# Patient Record
Sex: FEMALE | Race: WHITE | NOT HISPANIC OR LATINO | URBAN - METROPOLITAN AREA
[De-identification: names, ages, dates, MRNs, and addresses within clinical notes are randomized per-mention and may not be internally consistent; named-entity substitution may affect disease eponyms.]

---

## 2024-02-17 PROCEDURE — 89190 NASAL SMEAR FOR EOSINOPHILS: CPT

## 2024-02-19 ENCOUNTER — LAB REQUISITION (OUTPATIENT)
Dept: LAB | Facility: HOSPITAL | Age: 69
End: 2024-02-19
Payer: MEDICARE

## 2024-02-19 LAB — EOSINOPHIL SMEAR: ABNORMAL

## 2024-03-04 ENCOUNTER — LAB REQUISITION (OUTPATIENT)
Dept: LAB | Facility: HOSPITAL | Age: 69
End: 2024-03-04
Payer: MEDICARE

## 2024-03-05 LAB
LABORATORY COMMENT REPORT: NORMAL
PATH REPORT.GROSS SPEC: NORMAL
PATH REPORT.TOTAL CANCER: NORMAL

## 2024-03-07 ENCOUNTER — LAB REQUISITION (OUTPATIENT)
Dept: LAB | Facility: HOSPITAL | Age: 69
End: 2024-03-07
Payer: MEDICARE

## 2024-03-07 DIAGNOSIS — N04.9 NEPHROTIC SYNDROME WITH UNSPECIFIED MORPHOLOGIC CHANGES: ICD-10-CM

## 2024-03-07 PROCEDURE — 88350 IMFLUOR EA ADDL 1ANTB STN PX: CPT | Performed by: PATHOLOGY

## 2024-03-07 PROCEDURE — 88305 TISSUE EXAM BY PATHOLOGIST: CPT | Performed by: PATHOLOGY

## 2024-03-07 PROCEDURE — 88346 IMFLUOR 1ST 1ANTB STAIN PX: CPT

## 2024-03-07 PROCEDURE — 88313 SPECIAL STAINS GROUP 2: CPT

## 2024-03-07 PROCEDURE — 88348 ELECTRON MICROSCOPY DX: CPT

## 2024-03-07 PROCEDURE — 88348 ELECTRON MICROSCOPY DX: CPT | Performed by: PATHOLOGY

## 2024-03-07 PROCEDURE — 88346 IMFLUOR 1ST 1ANTB STAIN PX: CPT | Performed by: PATHOLOGY

## 2024-03-07 PROCEDURE — 88305 TISSUE EXAM BY PATHOLOGIST: CPT

## 2024-03-07 PROCEDURE — 88350 IMFLUOR EA ADDL 1ANTB STN PX: CPT

## 2024-03-07 PROCEDURE — 88313 SPECIAL STAINS GROUP 2: CPT | Performed by: PATHOLOGY

## 2024-03-19 LAB
LAB AP ASR DISCLAIMER: NORMAL
LABORATORY COMMENT REPORT: NORMAL
PATH REPORT.ADDENDUM SPEC: NORMAL
PATH REPORT.COMMENTS IMP SPEC: NORMAL
PATH REPORT.FINAL DX SPEC: NORMAL
PATH REPORT.GROSS SPEC: NORMAL
PATH REPORT.MICROSCOPIC SPEC OTHER STN: NORMAL
PATH REPORT.RELEVANT HX SPEC: NORMAL
PATH REPORT.TOTAL CANCER: NORMAL

## 2024-04-29 ENCOUNTER — LAB REQUISITION (OUTPATIENT)
Dept: LAB | Facility: HOSPITAL | Age: 69
End: 2024-04-29
Payer: MEDICARE

## 2024-04-29 ENCOUNTER — HOSPITAL ENCOUNTER (EMERGENCY)
Facility: HOSPITAL | Age: 69
Discharge: HOME | End: 2024-04-30
Attending: EMERGENCY MEDICINE
Payer: MEDICARE

## 2024-04-29 DIAGNOSIS — I48.19 OTHER PERSISTENT ATRIAL FIBRILLATION (MULTI): ICD-10-CM

## 2024-04-29 DIAGNOSIS — K59.00 CONSTIPATION, UNSPECIFIED CONSTIPATION TYPE: ICD-10-CM

## 2024-04-29 DIAGNOSIS — R07.9 CHEST PAIN, UNSPECIFIED TYPE: Primary | ICD-10-CM

## 2024-04-29 DIAGNOSIS — R51.9 ACUTE NONINTRACTABLE HEADACHE, UNSPECIFIED HEADACHE TYPE: ICD-10-CM

## 2024-04-29 LAB
ALBUMIN SERPL BCP-MCNC: 3.2 G/DL (ref 3.4–5)
ALP SERPL-CCNC: 45 U/L (ref 33–136)
ALT SERPL W P-5'-P-CCNC: 16 U/L (ref 7–45)
ANION GAP SERPL CALC-SCNC: 12 MMOL/L (ref 10–20)
AST SERPL W P-5'-P-CCNC: 19 U/L (ref 9–39)
BASOPHILS # BLD AUTO: 0.03 X10*3/UL (ref 0–0.1)
BASOPHILS NFR BLD AUTO: 0.4 %
BILIRUB SERPL-MCNC: 0.4 MG/DL (ref 0–1.2)
BUN SERPL-MCNC: 20 MG/DL (ref 6–23)
CALCIUM SERPL-MCNC: 9.9 MG/DL (ref 8.6–10.3)
CHLORIDE SERPL-SCNC: 105 MMOL/L (ref 98–107)
CO2 SERPL-SCNC: 25 MMOL/L (ref 21–32)
CREAT SERPL-MCNC: 1.12 MG/DL (ref 0.5–1.05)
EGFRCR SERPLBLD CKD-EPI 2021: 54 ML/MIN/1.73M*2
EOSINOPHIL # BLD AUTO: 0.28 X10*3/UL (ref 0–0.7)
EOSINOPHIL NFR BLD AUTO: 4.1 %
ERYTHROCYTE [DISTWIDTH] IN BLOOD BY AUTOMATED COUNT: 13.5 % (ref 11.5–14.5)
GLUCOSE SERPL-MCNC: 87 MG/DL (ref 74–99)
HCT VFR BLD AUTO: 34.3 % (ref 36–46)
HGB BLD-MCNC: 11.2 G/DL (ref 12–16)
IMM GRANULOCYTES # BLD AUTO: 0.01 X10*3/UL (ref 0–0.7)
IMM GRANULOCYTES NFR BLD AUTO: 0.1 % (ref 0–0.9)
LYMPHOCYTES # BLD AUTO: 1.88 X10*3/UL (ref 1.2–4.8)
LYMPHOCYTES NFR BLD AUTO: 27.4 %
MCH RBC QN AUTO: 29.6 PG (ref 26–34)
MCHC RBC AUTO-ENTMCNC: 32.7 G/DL (ref 32–36)
MCV RBC AUTO: 91 FL (ref 80–100)
MONOCYTES # BLD AUTO: 0.49 X10*3/UL (ref 0.1–1)
MONOCYTES NFR BLD AUTO: 7.1 %
NEUTROPHILS # BLD AUTO: 4.17 X10*3/UL (ref 1.2–7.7)
NEUTROPHILS NFR BLD AUTO: 60.9 %
NRBC BLD-RTO: 0 /100 WBCS (ref 0–0)
PLATELET # BLD AUTO: 221 X10*3/UL (ref 150–450)
POTASSIUM SERPL-SCNC: 3.7 MMOL/L (ref 3.5–5.3)
PROT SERPL-MCNC: 5.4 G/DL (ref 6.4–8.2)
RBC # BLD AUTO: 3.79 X10*6/UL (ref 4–5.2)
SODIUM SERPL-SCNC: 138 MMOL/L (ref 136–145)
WBC # BLD AUTO: 6.9 X10*3/UL (ref 4.4–11.3)

## 2024-04-29 PROCEDURE — 85025 COMPLETE CBC W/AUTO DIFF WBC: CPT

## 2024-04-29 PROCEDURE — 93005 ELECTROCARDIOGRAM TRACING: CPT

## 2024-04-29 PROCEDURE — 80053 COMPREHEN METABOLIC PANEL: CPT

## 2024-04-29 PROCEDURE — 99285 EMERGENCY DEPT VISIT HI MDM: CPT | Mod: 25

## 2024-04-29 ASSESSMENT — COLUMBIA-SUICIDE SEVERITY RATING SCALE - C-SSRS
1. IN THE PAST MONTH, HAVE YOU WISHED YOU WERE DEAD OR WISHED YOU COULD GO TO SLEEP AND NOT WAKE UP?: NO
6. HAVE YOU EVER DONE ANYTHING, STARTED TO DO ANYTHING, OR PREPARED TO DO ANYTHING TO END YOUR LIFE?: NO
2. HAVE YOU ACTUALLY HAD ANY THOUGHTS OF KILLING YOURSELF?: NO

## 2024-04-29 ASSESSMENT — PAIN SCALES - GENERAL
PAINLEVEL_OUTOF10: 9
PAINLEVEL_OUTOF10: 9

## 2024-04-29 ASSESSMENT — LIFESTYLE VARIABLES
EVER HAD A DRINK FIRST THING IN THE MORNING TO STEADY YOUR NERVES TO GET RID OF A HANGOVER: NO
HAVE PEOPLE ANNOYED YOU BY CRITICIZING YOUR DRINKING: NO
EVER FELT BAD OR GUILTY ABOUT YOUR DRINKING: NO
HAVE YOU EVER FELT YOU SHOULD CUT DOWN ON YOUR DRINKING: NO
TOTAL SCORE: 0

## 2024-04-29 ASSESSMENT — PAIN DESCRIPTION - PAIN TYPE: TYPE: ACUTE PAIN

## 2024-04-29 ASSESSMENT — PAIN - FUNCTIONAL ASSESSMENT: PAIN_FUNCTIONAL_ASSESSMENT: 0-10

## 2024-04-29 ASSESSMENT — PAIN DESCRIPTION - LOCATION
LOCATION_2: CHEST
LOCATION: HEAD

## 2024-04-29 ASSESSMENT — PAIN DESCRIPTION - DESCRIPTORS: DESCRIPTORS_2: HEAVINESS;ACHING

## 2024-04-30 ENCOUNTER — APPOINTMENT (OUTPATIENT)
Dept: CARDIOLOGY | Facility: HOSPITAL | Age: 69
End: 2024-04-30
Payer: MEDICARE

## 2024-04-30 ENCOUNTER — APPOINTMENT (OUTPATIENT)
Dept: RADIOLOGY | Facility: HOSPITAL | Age: 69
End: 2024-04-30
Payer: MEDICARE

## 2024-04-30 VITALS
WEIGHT: 212 LBS | HEIGHT: 63 IN | SYSTOLIC BLOOD PRESSURE: 149 MMHG | RESPIRATION RATE: 16 BRPM | BODY MASS INDEX: 37.56 KG/M2 | HEART RATE: 72 BPM | OXYGEN SATURATION: 97 % | TEMPERATURE: 96.8 F | DIASTOLIC BLOOD PRESSURE: 64 MMHG

## 2024-04-30 PROBLEM — I67.1 INTRACRANIAL ANEURYSM (HHS-HCC): Status: ACTIVE | Noted: 2024-02-19

## 2024-04-30 PROBLEM — E83.42 HYPOMAGNESEMIA: Status: ACTIVE | Noted: 2024-02-15

## 2024-04-30 PROBLEM — G89.29 CHRONIC PAIN OF RIGHT KNEE: Status: ACTIVE | Noted: 2021-05-04

## 2024-04-30 PROBLEM — M25.561 CHRONIC PAIN OF RIGHT KNEE: Status: ACTIVE | Noted: 2021-05-04

## 2024-04-30 PROBLEM — K59.03 DRUG-INDUCED CONSTIPATION: Status: ACTIVE | Noted: 2024-04-02

## 2024-04-30 PROBLEM — G47.00 INSOMNIA: Status: ACTIVE | Noted: 2024-03-08

## 2024-04-30 PROBLEM — I25.10 CORONARY ARTERY DISEASE INVOLVING NATIVE CORONARY ARTERY OF NATIVE HEART WITHOUT ANGINA PECTORIS: Status: ACTIVE | Noted: 2024-03-12

## 2024-04-30 PROBLEM — K57.30 DIVERTICULOSIS OF COLON WITHOUT DIVERTICULITIS: Status: ACTIVE | Noted: 2024-03-02

## 2024-04-30 PROBLEM — R93.89 ABNORMAL MRI: Status: ACTIVE | Noted: 2024-04-17

## 2024-04-30 PROBLEM — N17.9 AKI (ACUTE KIDNEY INJURY) (CMS-HCC): Status: ACTIVE | Noted: 2024-02-19

## 2024-04-30 PROBLEM — R73.9 HYPERGLYCEMIA: Status: ACTIVE | Noted: 2024-04-16

## 2024-04-30 PROBLEM — E04.2 NONTOXIC MULTINODULAR GOITER: Status: ACTIVE | Noted: 2021-06-16

## 2024-04-30 PROBLEM — R53.1 ASTHENIA: Status: ACTIVE | Noted: 2020-03-04

## 2024-04-30 PROBLEM — M94.0 TIETZE'S DISEASE: Status: ACTIVE | Noted: 2024-04-30

## 2024-04-30 PROBLEM — C7A.026: Status: ACTIVE | Noted: 2021-06-16

## 2024-04-30 PROBLEM — E11.10 DIABETIC KETOACIDOSIS WITHOUT COMA ASSOCIATED WITH TYPE 2 DIABETES MELLITUS (MULTI): Status: ACTIVE | Noted: 2019-06-13

## 2024-04-30 PROBLEM — I63.9 CEREBRAL INFARCTION (MULTI): Status: ACTIVE | Noted: 2022-05-25

## 2024-04-30 PROBLEM — K31.84 GASTROPARESIS: Status: ACTIVE | Noted: 2024-04-16

## 2024-04-30 PROBLEM — I21.4 NSTEMI (NON-ST ELEVATED MYOCARDIAL INFARCTION) (MULTI): Status: ACTIVE | Noted: 2024-03-11

## 2024-04-30 PROBLEM — K29.70 GASTRITIS WITHOUT BLEEDING: Status: ACTIVE | Noted: 2024-02-22

## 2024-04-30 PROBLEM — E10.9 TYPE 1 DIABETES MELLITUS (MULTI): Status: ACTIVE | Noted: 2020-03-16

## 2024-04-30 PROBLEM — E44.1 MALNUTRITION OF MILD DEGREE (MULTI): Status: ACTIVE | Noted: 2024-04-09

## 2024-04-30 LAB
ALBUMIN SERPL BCP-MCNC: 3.3 G/DL (ref 3.4–5)
ALP SERPL-CCNC: 41 U/L (ref 33–136)
ALT SERPL W P-5'-P-CCNC: 16 U/L (ref 7–45)
ANION GAP SERPL CALC-SCNC: 16 MMOL/L (ref 10–20)
AST SERPL W P-5'-P-CCNC: 21 U/L (ref 9–39)
BASOPHILS # BLD AUTO: 0.05 X10*3/UL (ref 0–0.1)
BASOPHILS NFR BLD AUTO: 0.7 %
BILIRUB SERPL-MCNC: 0.5 MG/DL (ref 0–1.2)
BUN SERPL-MCNC: 21 MG/DL (ref 6–23)
CALCIUM SERPL-MCNC: 9.8 MG/DL (ref 8.6–10.3)
CARDIAC TROPONIN I PNL SERPL HS: 30 NG/L (ref 0–13)
CARDIAC TROPONIN I PNL SERPL HS: 30 NG/L (ref 0–13)
CHLORIDE SERPL-SCNC: 105 MMOL/L (ref 98–107)
CO2 SERPL-SCNC: 21 MMOL/L (ref 21–32)
CREAT SERPL-MCNC: 1.26 MG/DL (ref 0.5–1.05)
EGFRCR SERPLBLD CKD-EPI 2021: 47 ML/MIN/1.73M*2
EOSINOPHIL # BLD AUTO: 0.31 X10*3/UL (ref 0–0.7)
EOSINOPHIL NFR BLD AUTO: 4.1 %
ERYTHROCYTE [DISTWIDTH] IN BLOOD BY AUTOMATED COUNT: 13.4 % (ref 11.5–14.5)
GLUCOSE SERPL-MCNC: 189 MG/DL (ref 74–99)
HCT VFR BLD AUTO: 35 % (ref 36–46)
HGB BLD-MCNC: 11.7 G/DL (ref 12–16)
IMM GRANULOCYTES # BLD AUTO: 0.02 X10*3/UL (ref 0–0.7)
IMM GRANULOCYTES NFR BLD AUTO: 0.3 % (ref 0–0.9)
LYMPHOCYTES # BLD AUTO: 1.95 X10*3/UL (ref 1.2–4.8)
LYMPHOCYTES NFR BLD AUTO: 25.6 %
MAGNESIUM SERPL-MCNC: 1.99 MG/DL (ref 1.6–2.4)
MCH RBC QN AUTO: 30.2 PG (ref 26–34)
MCHC RBC AUTO-ENTMCNC: 33.4 G/DL (ref 32–36)
MCV RBC AUTO: 90 FL (ref 80–100)
MONOCYTES # BLD AUTO: 0.49 X10*3/UL (ref 0.1–1)
MONOCYTES NFR BLD AUTO: 6.4 %
NEUTROPHILS # BLD AUTO: 4.79 X10*3/UL (ref 1.2–7.7)
NEUTROPHILS NFR BLD AUTO: 62.9 %
NRBC BLD-RTO: 0 /100 WBCS (ref 0–0)
PLATELET # BLD AUTO: 208 X10*3/UL (ref 150–450)
POTASSIUM SERPL-SCNC: 4.2 MMOL/L (ref 3.5–5.3)
PROT SERPL-MCNC: 5.5 G/DL (ref 6.4–8.2)
RBC # BLD AUTO: 3.87 X10*6/UL (ref 4–5.2)
SODIUM SERPL-SCNC: 138 MMOL/L (ref 136–145)
WBC # BLD AUTO: 7.6 X10*3/UL (ref 4.4–11.3)

## 2024-04-30 PROCEDURE — 71046 X-RAY EXAM CHEST 2 VIEWS: CPT | Performed by: RADIOLOGY

## 2024-04-30 PROCEDURE — 71046 X-RAY EXAM CHEST 2 VIEWS: CPT

## 2024-04-30 PROCEDURE — 36415 COLL VENOUS BLD VENIPUNCTURE: CPT | Performed by: NURSE PRACTITIONER

## 2024-04-30 PROCEDURE — 70450 CT HEAD/BRAIN W/O DYE: CPT

## 2024-04-30 PROCEDURE — 70450 CT HEAD/BRAIN W/O DYE: CPT | Performed by: SURGERY

## 2024-04-30 PROCEDURE — 84484 ASSAY OF TROPONIN QUANT: CPT | Performed by: NURSE PRACTITIONER

## 2024-04-30 PROCEDURE — 83735 ASSAY OF MAGNESIUM: CPT | Performed by: NURSE PRACTITIONER

## 2024-04-30 PROCEDURE — 85025 COMPLETE CBC W/AUTO DIFF WBC: CPT | Performed by: NURSE PRACTITIONER

## 2024-04-30 PROCEDURE — 80053 COMPREHEN METABOLIC PANEL: CPT | Performed by: NURSE PRACTITIONER

## 2024-04-30 RX ORDER — POLYETHYLENE GLYCOL 3350 17 G/17G
17 POWDER, FOR SOLUTION ORAL 2 TIMES DAILY
Qty: 14 PACKET | Refills: 0 | Status: SHIPPED | OUTPATIENT
Start: 2024-04-30 | End: 2024-05-14

## 2024-04-30 ASSESSMENT — PAIN SCALES - GENERAL: PAINLEVEL_OUTOF10: 0 - NO PAIN

## 2024-04-30 NOTE — ED PROVIDER NOTES
HPI   Chief Complaint   Patient presents with    Chest Pain     Pt sts with onset chest pain and headache 1 hr PTA. Sts with intermittent dyspnea/dizziness. Also c/o abdominal pain with rectal pressure with LBM x1 week ago.       Patient is nontoxic-appearing 68-year-old female with past medical history of MARIA E, dysautonomia, cerebral infarction, NSTEMI, fibromyositis, type 1 diabetes, diverticulosis, diabetic ketoacidosis, insomnia, presents to the emergency today for complaint of chest pain, headache pain and shortness of breath.  Patient states symptoms began earlier today.  Patient states she was concerned as she had a stroke and heart attack within the past several months.  Patient states chest pain has been midsternal no alleviating or worsening factors has been fairly constant.  Patient states headache pain today feels different than previous episodes.  Patient denies any visual disturbances, numbness or tingling, injuries trauma or falls.  Patient states shortness of breath has been intermittent.  Patient complains of intermittent palpitations however denies any lightheadedness, dizziness or syncopal events.  Patient denies any abdominal pain, nausea, vomiting, diarrhea or constipation, urinary complaints, fever, shaking, or chills.                          Ina Coma Scale Score: 15         NIH Stroke Scale: 0             Patient History   Past Medical History:   Diagnosis Date    Diabetes mellitus (Multi)     Hypertension     Lupus (systemic lupus erythematosus) (Multi)     Myocardial infarction (Multi)     Renal failure     Rheumatoid arthritis (Multi)     Stroke (Multi)      Past Surgical History:   Procedure Laterality Date    CORONARY STENT PLACEMENT      THORACENTESIS       No family history on file.  Social History     Tobacco Use    Smoking status: Not on file    Smokeless tobacco: Not on file   Substance Use Topics    Alcohol use: Not on file    Drug use: Not on file       Physical Exam   ED  Triage Vitals   Temperature Heart Rate Respirations BP   04/29/24 2340 04/29/24 2340 04/29/24 2340 04/29/24 2340   35.1 °C (95.2 °F) 80 17 115/82      Pulse Ox Temp Source Heart Rate Source Patient Position   04/29/24 2340 04/29/24 2340 04/29/24 2340 04/30/24 0050   94 % Temporal Monitor Sitting      BP Location FiO2 (%)     04/30/24 0050 --     Right arm        Physical Exam  Vitals and nursing note reviewed. Exam conducted with a chaperone present.   Constitutional:       General: She is not in acute distress.     Appearance: Normal appearance. She is not ill-appearing, toxic-appearing or diaphoretic.      Interventions: She is not intubated.  HENT:      Head: Normocephalic.      Right Ear: Tympanic membrane, ear canal and external ear normal.      Left Ear: Tympanic membrane, ear canal and external ear normal.      Nose: Nose normal.      Mouth/Throat:      Mouth: Mucous membranes are moist.      Pharynx: No oropharyngeal exudate or posterior oropharyngeal erythema.   Eyes:      General:         Right eye: No discharge.         Left eye: No discharge.      Extraocular Movements: Extraocular movements intact.      Pupils: Pupils are equal, round, and reactive to light.   Neck:      Vascular: No carotid bruit.   Cardiovascular:      Rate and Rhythm: Normal rate and regular rhythm.      Pulses: Normal pulses. No decreased pulses.      Heart sounds: Normal heart sounds. Heart sounds not distant. No murmur heard.     No friction rub. No gallop.   Pulmonary:      Effort: Pulmonary effort is normal. No tachypnea, bradypnea, accessory muscle usage, prolonged expiration, respiratory distress or retractions. She is not intubated.      Breath sounds: Normal breath sounds. No stridor, decreased air movement or transmitted upper airway sounds. No decreased breath sounds, wheezing, rhonchi or rales.   Chest:      Chest wall: No tenderness.   Abdominal:      General: Abdomen is flat. Bowel sounds are normal.      Palpations:  Abdomen is soft.   Musculoskeletal:         General: Normal range of motion.      Cervical back: Normal range of motion and neck supple. No rigidity or tenderness.   Lymphadenopathy:      Cervical: No cervical adenopathy.   Skin:     General: Skin is warm and dry.      Capillary Refill: Capillary refill takes less than 2 seconds.   Neurological:      General: No focal deficit present.      Mental Status: She is alert and oriented to person, place, and time.         ED Course & MDM   ED Course as of 04/30/24 0354   Tue Apr 30, 2024   0213 Chest x-ray reveals  IMPRESSION:  No radiographic evidence of acute cardiopulmonary pathology   [EC]   0314 CT of the head reveals  IMPRESSION:  No evidence of acute cortical infarct or intracranial hemorrhage.      Partially calcified rounded cystic structures in the right frontal  scalp in contiguity with the dermis, most compatible with sebaceous  cysts. Correlation with direct inspection suggested.   [EC]      ED Course User Index  [EC] Farhad Chavez, APRN-CNP         Diagnoses as of 04/30/24 0354   Chest pain, unspecified type   Acute nonintractable headache, unspecified headache type   Constipation, unspecified constipation type       Medical Decision Making  Given patient's late presentation a thorough exam was performed.  Patient remains neurologically intact with no focal deficits, no nuchal rigidity, pupils equal active round bilaterally, EOMs are intact bilaterally no nystagmus diplopia, no ataxia or drift, face symmetrical, tongue is midline, able to shrug shoulders bilaterally though any weakness, no weakness present bilateral upper or lower extremities, NIH of 0, I have low suspicion for acute intracranial process.  No adventitious lung sounds auscultated, speaking clearly no distress, cardiac sounds auscultated are regular, I have a low suspicion for ACS, pulm embolism, aortic abdominal aneurysm. Lab work revealed several irregularities out any critical lab values  that are consistent with previous lab values.  CT of the head as interpreted by radiologist reveals no acute intracranial process.  X-ray of the chest as interpreted by radiologist reveals no acute cardiopulmonary process.  EKG interpreted by myself reveals sinus rhythm at a rate of 79 bpm with no ST elevation however there is T wave inversions in leads I, II, III, aVF, V3, V4, V5 and V6, and is consistent with previous EKG.  Reevaluation patient reveals some improvement.  Patient was concerned about fecal impaction.  Patient received prescription for MiraLAX and Fleet enema to be used as needed by nursing home staff for constipation.  I encouraged monitoring symptoms, if they become worse return to emergency room for further evaluation, otherwise follow primary care provider as needed.  Patient was agreeable this plan discharged home in stable condition.    LENI Munoz     Portions of this note were generated using digital voice recognition software, and may contain grammatical errors      Procedure  Procedures     LENI Munoz  04/30/24 0354

## 2024-05-03 ENCOUNTER — APPOINTMENT (OUTPATIENT)
Dept: RADIOLOGY | Facility: HOSPITAL | Age: 69
End: 2024-05-03
Payer: MEDICARE

## 2024-05-03 ENCOUNTER — APPOINTMENT (OUTPATIENT)
Dept: CARDIOLOGY | Facility: HOSPITAL | Age: 69
End: 2024-05-03
Payer: MEDICARE

## 2024-05-03 ENCOUNTER — HOSPITAL ENCOUNTER (EMERGENCY)
Facility: HOSPITAL | Age: 69
Discharge: HOME | End: 2024-05-03
Attending: STUDENT IN AN ORGANIZED HEALTH CARE EDUCATION/TRAINING PROGRAM
Payer: MEDICARE

## 2024-05-03 VITALS
DIASTOLIC BLOOD PRESSURE: 69 MMHG | SYSTOLIC BLOOD PRESSURE: 130 MMHG | OXYGEN SATURATION: 99 % | WEIGHT: 212 LBS | HEIGHT: 63 IN | TEMPERATURE: 97.9 F | HEART RATE: 74 BPM | BODY MASS INDEX: 37.56 KG/M2 | RESPIRATION RATE: 20 BRPM

## 2024-05-03 DIAGNOSIS — R03.0 ELEVATED BLOOD PRESSURE READING: Primary | ICD-10-CM

## 2024-05-03 DIAGNOSIS — E87.6 HYPOKALEMIA: ICD-10-CM

## 2024-05-03 DIAGNOSIS — R07.9 CHEST PAIN, UNSPECIFIED TYPE: ICD-10-CM

## 2024-05-03 LAB
ALBUMIN SERPL BCP-MCNC: 3.5 G/DL (ref 3.4–5)
ALP SERPL-CCNC: 45 U/L (ref 33–136)
ALT SERPL W P-5'-P-CCNC: 27 U/L (ref 7–45)
ANION GAP SERPL CALC-SCNC: 12 MMOL/L (ref 10–20)
AST SERPL W P-5'-P-CCNC: 30 U/L (ref 9–39)
BASOPHILS # BLD AUTO: 0.05 X10*3/UL (ref 0–0.1)
BASOPHILS NFR BLD AUTO: 0.9 %
BILIRUB SERPL-MCNC: 0.6 MG/DL (ref 0–1.2)
BNP SERPL-MCNC: 225 PG/ML (ref 0–99)
BUN SERPL-MCNC: 22 MG/DL (ref 6–23)
CALCIUM SERPL-MCNC: 9.6 MG/DL (ref 8.6–10.3)
CARDIAC TROPONIN I PNL SERPL HS: 38 NG/L (ref 0–13)
CARDIAC TROPONIN I PNL SERPL HS: 40 NG/L (ref 0–13)
CHLORIDE SERPL-SCNC: 104 MMOL/L (ref 98–107)
CO2 SERPL-SCNC: 24 MMOL/L (ref 21–32)
CREAT SERPL-MCNC: 1.23 MG/DL (ref 0.5–1.05)
EGFRCR SERPLBLD CKD-EPI 2021: 49 ML/MIN/1.73M*2
EOSINOPHIL # BLD AUTO: 0.13 X10*3/UL (ref 0–0.7)
EOSINOPHIL NFR BLD AUTO: 2.3 %
ERYTHROCYTE [DISTWIDTH] IN BLOOD BY AUTOMATED COUNT: 13.4 % (ref 11.5–14.5)
GLUCOSE SERPL-MCNC: 199 MG/DL (ref 74–99)
HCT VFR BLD AUTO: 35.4 % (ref 36–46)
HGB BLD-MCNC: 11.5 G/DL (ref 12–16)
IMM GRANULOCYTES # BLD AUTO: 0.02 X10*3/UL (ref 0–0.7)
IMM GRANULOCYTES NFR BLD AUTO: 0.3 % (ref 0–0.9)
LYMPHOCYTES # BLD AUTO: 1.64 X10*3/UL (ref 1.2–4.8)
LYMPHOCYTES NFR BLD AUTO: 28.7 %
MAGNESIUM SERPL-MCNC: 2.01 MG/DL (ref 1.6–2.4)
MCH RBC QN AUTO: 29.9 PG (ref 26–34)
MCHC RBC AUTO-ENTMCNC: 32.5 G/DL (ref 32–36)
MCV RBC AUTO: 92 FL (ref 80–100)
MONOCYTES # BLD AUTO: 0.5 X10*3/UL (ref 0.1–1)
MONOCYTES NFR BLD AUTO: 8.7 %
NEUTROPHILS # BLD AUTO: 3.38 X10*3/UL (ref 1.2–7.7)
NEUTROPHILS NFR BLD AUTO: 59.1 %
NRBC BLD-RTO: 0 /100 WBCS (ref 0–0)
PLATELET # BLD AUTO: 253 X10*3/UL (ref 150–450)
POTASSIUM SERPL-SCNC: 3.4 MMOL/L (ref 3.5–5.3)
PROT SERPL-MCNC: 6.4 G/DL (ref 6.4–8.2)
RBC # BLD AUTO: 3.85 X10*6/UL (ref 4–5.2)
SODIUM SERPL-SCNC: 137 MMOL/L (ref 136–145)
WBC # BLD AUTO: 5.7 X10*3/UL (ref 4.4–11.3)

## 2024-05-03 PROCEDURE — 36415 COLL VENOUS BLD VENIPUNCTURE: CPT | Performed by: PHYSICIAN ASSISTANT

## 2024-05-03 PROCEDURE — 80053 COMPREHEN METABOLIC PANEL: CPT | Performed by: PHYSICIAN ASSISTANT

## 2024-05-03 PROCEDURE — 93005 ELECTROCARDIOGRAM TRACING: CPT

## 2024-05-03 PROCEDURE — 83735 ASSAY OF MAGNESIUM: CPT | Performed by: PHYSICIAN ASSISTANT

## 2024-05-03 PROCEDURE — 84484 ASSAY OF TROPONIN QUANT: CPT | Performed by: PHYSICIAN ASSISTANT

## 2024-05-03 PROCEDURE — 85025 COMPLETE CBC W/AUTO DIFF WBC: CPT | Performed by: PHYSICIAN ASSISTANT

## 2024-05-03 PROCEDURE — 71046 X-RAY EXAM CHEST 2 VIEWS: CPT

## 2024-05-03 PROCEDURE — 2500000001 HC RX 250 WO HCPCS SELF ADMINISTERED DRUGS (ALT 637 FOR MEDICARE OP): Performed by: PHYSICIAN ASSISTANT

## 2024-05-03 PROCEDURE — 96375 TX/PRO/DX INJ NEW DRUG ADDON: CPT

## 2024-05-03 PROCEDURE — 71046 X-RAY EXAM CHEST 2 VIEWS: CPT | Performed by: RADIOLOGY

## 2024-05-03 PROCEDURE — 83880 ASSAY OF NATRIURETIC PEPTIDE: CPT | Performed by: PHYSICIAN ASSISTANT

## 2024-05-03 PROCEDURE — 2500000004 HC RX 250 GENERAL PHARMACY W/ HCPCS (ALT 636 FOR OP/ED): Performed by: PHYSICIAN ASSISTANT

## 2024-05-03 PROCEDURE — 96374 THER/PROPH/DIAG INJ IV PUSH: CPT

## 2024-05-03 PROCEDURE — 99284 EMERGENCY DEPT VISIT MOD MDM: CPT | Mod: 25

## 2024-05-03 PROCEDURE — 2500000006 HC RX 250 W HCPCS SELF ADMINISTERED DRUGS (ALT 637 FOR ALL PAYERS): Performed by: PHYSICIAN ASSISTANT

## 2024-05-03 RX ORDER — POTASSIUM CHLORIDE 20 MEQ/1
20 TABLET, EXTENDED RELEASE ORAL DAILY
Status: DISCONTINUED | OUTPATIENT
Start: 2024-05-03 | End: 2024-05-03 | Stop reason: HOSPADM

## 2024-05-03 RX ORDER — MORPHINE SULFATE 4 MG/ML
4 INJECTION, SOLUTION INTRAMUSCULAR; INTRAVENOUS ONCE
Status: COMPLETED | OUTPATIENT
Start: 2024-05-03 | End: 2024-05-03

## 2024-05-03 RX ORDER — ASPIRIN 325 MG
325 TABLET ORAL ONCE
Status: COMPLETED | OUTPATIENT
Start: 2024-05-03 | End: 2024-05-03

## 2024-05-03 RX ORDER — ONDANSETRON HYDROCHLORIDE 2 MG/ML
4 INJECTION, SOLUTION INTRAVENOUS ONCE
Status: COMPLETED | OUTPATIENT
Start: 2024-05-03 | End: 2024-05-03

## 2024-05-03 RX ADMIN — POTASSIUM CHLORIDE 20 MEQ: 1500 TABLET, EXTENDED RELEASE ORAL at 09:19

## 2024-05-03 RX ADMIN — ASPIRIN 325 MG ORAL TABLET 325 MG: 325 PILL ORAL at 06:53

## 2024-05-03 RX ADMIN — ONDANSETRON 4 MG: 2 INJECTION INTRAMUSCULAR; INTRAVENOUS at 06:53

## 2024-05-03 RX ADMIN — MORPHINE SULFATE 4 MG: 4 INJECTION, SOLUTION INTRAMUSCULAR; INTRAVENOUS at 06:53

## 2024-05-03 ASSESSMENT — LIFESTYLE VARIABLES
EVER FELT BAD OR GUILTY ABOUT YOUR DRINKING: NO
HAVE YOU EVER FELT YOU SHOULD CUT DOWN ON YOUR DRINKING: NO
TOTAL SCORE: 0
HAVE PEOPLE ANNOYED YOU BY CRITICIZING YOUR DRINKING: NO
EVER HAD A DRINK FIRST THING IN THE MORNING TO STEADY YOUR NERVES TO GET RID OF A HANGOVER: NO

## 2024-05-03 ASSESSMENT — PAIN SCALES - GENERAL: PAINLEVEL_OUTOF10: 0 - NO PAIN

## 2024-05-03 ASSESSMENT — PAIN - FUNCTIONAL ASSESSMENT: PAIN_FUNCTIONAL_ASSESSMENT: 0-10

## 2024-05-03 ASSESSMENT — COLUMBIA-SUICIDE SEVERITY RATING SCALE - C-SSRS
6. HAVE YOU EVER DONE ANYTHING, STARTED TO DO ANYTHING, OR PREPARED TO DO ANYTHING TO END YOUR LIFE?: NO
1. IN THE PAST MONTH, HAVE YOU WISHED YOU WERE DEAD OR WISHED YOU COULD GO TO SLEEP AND NOT WAKE UP?: NO
2. HAVE YOU ACTUALLY HAD ANY THOUGHTS OF KILLING YOURSELF?: NO

## 2024-05-03 ASSESSMENT — PAIN DESCRIPTION - PROGRESSION: CLINICAL_PROGRESSION: NOT CHANGED

## 2024-05-03 NOTE — ED TRIAGE NOTES
Pt comes into ed via [facility] ems. Pt states left sided cheat pain for past few hours.pt also hypertensive which she had a hx of HTN. Pt not in acute distress during triage

## 2024-05-03 NOTE — DISCHARGE INSTRUCTIONS
You are found to have elevated blood pressure here in the emergency department I recommend that you follow-up with your primary care doctor soon as possible to have your blood pressure reevaluated and see if you require either a change in medication or be started on medication.  Follow-up with your primary care doctor to have your potassium level rechecked in the next 48 hours.    You have been evaluated in the Emergency Department for chest pain. There are many different causes of chest pain. Some of these causes could be serious, such as a heart attack or blood clot in the lungs, but many other causes are not life threatening, such as heartburn, viral infections, bruised bones/muscles, etc. On evaluation we did not find any emergent causes for your chest pain at this time.     Please return to Emergency Department or seek medical attention immediately if you have acute worsening in your chest pain or develop shortness of breath, repeated vomiting, fever, altered level of consciousness, coughing up blood, or start sweating and feel clammy.    If you were prescribed any medicine for home, please take as prescribed by your health-care provider. If you were given any follow-up appointments or numbers to call, please do so as instructed. Avoid any activities that bring on the chest pain. Also, avoid any tobacco products or excessive alcohol. Please ensure understanding of these instructions prior to discharge.    Please call your primary care physician and follow-up with them in the next 1 to 2 days.

## 2024-05-03 NOTE — ED PROVIDER NOTES
Limitations to History: none  External Records Reviewed  Independent Historians: none  Social determinants affecting care: none    HPI  Cami Aragon is a 68 y.o. female with history of hypertension, hyperlipidemia, diabetes mellitus, CVA, CAD, rheumatoid arthritis, chronic kidney disease, who presents emergency department for assessment of chest pain that started in the middle of the night.  She reports that chest pain is located to the left lateral breast.  She reports that it felt like a gnawing pressure.  She denies associated shortness of breath, nausea, vomiting, or sweats.  Since arriving to the emergency department, her pain has significantly improved.  She denies anything specifically alleviating or aggravating pain.  She denies any upper or lower extremity numbness, tingling, weakness.  She denies slurred speech or headache.  She has not had vision change.  She has been compliant with her medications.  She is currently at a skilled nursing facility for rehab.  She reports that yesterday she did have some nausea and diarrhea.  She denies smoking cigarettes.  She reports significant cardiac family history.  She had 2 stents placed on 3/12/2024.  She has no further complaints.    PMH  Past Medical History:   Diagnosis Date    Diabetes mellitus (Multi)     Hypertension     Lupus (systemic lupus erythematosus) (Multi)     Myocardial infarction (Multi)     Renal failure     Rheumatoid arthritis (Multi)     Stroke (Multi)     reviewed by myself.    Meds  Current Outpatient Medications   Medication Instructions    polyethylene glycol (GLYCOLAX, MIRALAX) 17 g, oral, 2 times daily       Allergies  Allergies   Allergen Reactions    Amoxicillin Other    Formaldehyde Other    Insulin Isophane (Nph) Nausea/vomiting    Insulin Regular Nausea/vomiting    Jardiance [Empagliflozin] Other    Lantus Solostar U-100 Insulin [Insulin Glargine] Nausea/vomiting    Novolog Mix 70-30 U-100 Insuln [Insulin Asp Prt-Insulin Aspart]  "Nausea/vomiting    Sulfa (Sulfonamide Antibiotics) Nausea/vomiting    reviewed by myself.    SHx    reviewed by myself.      ------------------------------------------------------------------------------------------------------------------------------------------    /62   Pulse 71   Temp 36.6 °C (97.9 °F)   Resp 19   Ht 1.6 m (5' 2.99\")   Wt 96.2 kg (212 lb)   SpO2 99%   BMI 37.56 kg/m²     Physical Exam  Vitals and nursing note reviewed.   Constitutional:       General: She is not in acute distress.     Appearance: Normal appearance. She is well-developed. She is obese. She is not ill-appearing or toxic-appearing.   HENT:      Head: Normocephalic.      Nose: Nose normal.      Mouth/Throat:      Mouth: Mucous membranes are moist.      Pharynx: Oropharynx is clear.   Eyes:      Extraocular Movements: Extraocular movements intact.      Conjunctiva/sclera: Conjunctivae normal.   Cardiovascular:      Rate and Rhythm: Normal rate and regular rhythm.      Pulses:           Radial pulses are 2+ on the right side and 2+ on the left side.        Dorsalis pedis pulses are 2+ on the right side and 2+ on the left side.   Pulmonary:      Effort: Pulmonary effort is normal.      Breath sounds: Normal breath sounds.   Abdominal:      General: Abdomen is flat.      Palpations: Abdomen is soft.      Tenderness: There is no abdominal tenderness.   Musculoskeletal:         General: Normal range of motion.      Cervical back: Neck supple.      Right lower leg: No edema.      Left lower leg: No edema.   Skin:     General: Skin is warm and dry.   Neurological:      General: No focal deficit present.      Mental Status: She is alert and oriented to person, place, and time.   Psychiatric:         Attention and Perception: Attention normal.         Mood and Affect: Mood normal.      "     ------------------------------------------------------------------------------------------------------------------------------------------  Labs  Labs Reviewed   CBC WITH AUTO DIFFERENTIAL - Abnormal       Result Value    WBC 5.7      nRBC 0.0      RBC 3.85 (*)     Hemoglobin 11.5 (*)     Hematocrit 35.4 (*)     MCV 92      MCH 29.9      MCHC 32.5      RDW 13.4      Platelets 253      Neutrophils % 59.1      Immature Granulocytes %, Automated 0.3      Lymphocytes % 28.7      Monocytes % 8.7      Eosinophils % 2.3      Basophils % 0.9      Neutrophils Absolute 3.38      Immature Granulocytes Absolute, Automated 0.02      Lymphocytes Absolute 1.64      Monocytes Absolute 0.50      Eosinophils Absolute 0.13      Basophils Absolute 0.05     COMPREHENSIVE METABOLIC PANEL - Abnormal    Glucose 199 (*)     Sodium 137      Potassium 3.4 (*)     Chloride 104      Bicarbonate 24      Anion Gap 12      Urea Nitrogen 22      Creatinine 1.23 (*)     eGFR 49 (*)     Calcium 9.6      Albumin 3.5      Alkaline Phosphatase 45      Total Protein 6.4      AST 30      Bilirubin, Total 0.6      ALT 27     TROPONIN I, HIGH SENSITIVITY - Abnormal    Troponin I, High Sensitivity 40 (*)     Narrative:     Less than 99th percentile of normal range cutoff-  Female and children under 18 years old <14 ng/L; Male <21 ng/L: Negative  Repeat testing should be performed if clinically indicated.     Female and children under 18 years old 14-50 ng/L; Male 21-50 ng/L:  Consistent with possible cardiac damage and possible increased clinical   risk. Serial measurements may help to assess extent of myocardial damage.     >50 ng/L: Consistent with cardiac damage, increased clinical risk and  myocardial infarction. Serial measurements may help assess extent of   myocardial damage.      NOTE: Children less than 1 year old may have higher baseline troponin   levels and results should be interpreted in conjunction with the overall   clinical context.      NOTE: Troponin I testing is performed using a different   testing methodology at [facility] than at other   [facility]. Direct result comparisons should only   be made within the same method.   B-TYPE NATRIURETIC PEPTIDE - Abnormal     (*)     Narrative:        <100 pg/mL - Heart failure unlikely  100-299 pg/mL - Intermediate probability of acute heart                  failure exacerbation. Correlate with clinical                  context and patient history.    >=300 pg/mL - Heart Failure likely. Correlate with clinical                  context and patient history.    BNP testing is performed using different testing methodology at [facility] than at other [facility] hospitals. Direct result comparisons should only be made within the same method.      TROPONIN I, HIGH SENSITIVITY - Abnormal    Troponin I, High Sensitivity 38 (*)     Narrative:     Less than 99th percentile of normal range cutoff-  Female and children under 18 years old <14 ng/L; Male <21 ng/L: Negative  Repeat testing should be performed if clinically indicated.     Female and children under 18 years old 14-50 ng/L; Male 21-50 ng/L:  Consistent with possible cardiac damage and possible increased clinical   risk. Serial measurements may help to assess extent of myocardial damage.     >50 ng/L: Consistent with cardiac damage, increased clinical risk and  myocardial infarction. Serial measurements may help assess extent of   myocardial damage.      NOTE: Children less than 1 year old may have higher baseline troponin   levels and results should be interpreted in conjunction with the overall   clinical context.     NOTE: Troponin I testing is performed using a different   testing methodology at [facility] than at other   [facility]. Direct result comparisons should only   be made within the same method.   MAGNESIUM - Normal    Magnesium 2.01          Imaging  XR chest 2 views   Final Result    No acute cardiopulmonary disease.        MACRO:   none        Signed by: Alia Najera 5/3/2024 7:58 AM   Dictation workstation:   [id number]           ED Course  ED Course as of 05/03/24 0910   Fri May 03, 2024   0738 This patient was seen by the advanced practice provider.  I have personally performed a substantiative portion of the encounter.  I have seen and examined the patient; I agree with the workup, evaluation, MDM, management and diagnosis.  The care plan was discussed.    I personally saw the patient and made/approved the management plan and take responsibility for patient management:    My assessment revealed:    68-year-old presents emergency department for chest discomfort.  She has a known history of coronary artery disease had a recent catheterization in [facility] on 3/12.  She was found to have minimal disease in the left anterior descending as well as the RCA both were stented.  She presents emergency department for chest pressure on the left side.  It is not pleuritic pain.  She denies any nausea vomiting fever chills or any other muscle aches and pains at this time.  Patient states that she feels fine and that the nursing facility staff encouraged her to come to the ER for evaluation.  On examination vital signs are stable 2+ peripheral pulses abdomen nontender.  EKG showed T wave inversions in lateral leads with no elevation.  Sinus rhythm this EKG seems consistent with previous EKGs within EMR.  Her workup thus far shows a troponin of 40 however she has chronically elevated troponins.  Delta troponin is pending at this time.  CBC did not show any signs of leukocytosis CMP showed a creatinine of 1.23 which is around her baseline and BNP peptide of 225.  Differential considered however unlikely is aortic dissection pulmonary embolism cardiac tamponade Boerhaave's based on patient's history presentation examination.  Differential that would be ruled out is NSTEMI based on delta troponin.   Chest x-ray showed no acute cardiopulmonary process [ZS]   0909 Troponin I, High Sensitivity(!)  Patient will be discharged troponin downtrending. [ZS]   0909 POTASSIUM(!): 3.4  Oral potassium will be given to replete. [ZS]      ED Course User Index  [ZS] Carlos Smith MD         Diagnoses as of 05/03/24 0910   Elevated blood pressure reading   Chest pain, unspecified type   Hypokalemia        Medical Decision Making: She did not appear ill or toxic.  Vital signs reviewed.  She is hypertensive.  Otherwise hemodynamically stable.  She was placed on continuous cardiac and pulse ox monitor.  Cardiac workup was pursued.    I reviewed her cardiac catheterization on 3/12/2024.  She had stents placed to the RCA and LAD.  No other significant disease on this catheterization.    Differential diagnoses considered: ACS, hypertensive urgency, hypertensive emergency, MARIA E, electrolyte abnormalities, pneumonia, aortic dissection, others    Medications given: Oral aspirin, IV morphine, IV Zofran, oral potassium    EKG interpreted by myself and ED attending: Normal sinus rhythm paraventricular rate 64 bpm.  T wave inversions in lead I, 2, aVF, V3 through V6.  No acute changes from EKG on 4/29/2024 or 4/15/2024.    I reviewed the labs from today.  No leukocytosis or leukopenia.  Hemoglobin 11.5 with hematocrit of 35.4.  Glucose 199.  Potassium 3.4.  BUN 22 with a creatinine of 1.23.  BNP slightly elevated 225.  Troponin elevated at 40.  Magnesium 2.01.  Second troponin has gone down to 38.  Chest x-ray showing no acute cardiopulmonary process.  With her 2 negative troponins, no EKG changes, improved chest pain, and recent heart catheterization, I do not believe she needs admission at this time.  Discussed with the patient about the workup today.  I do recommend she follow-up with her cardiologist as an outpatient within 1 week.  She is to return to the ER immediately if symptoms change or worsen.  She verbalized understanding and  agreed to plan of care.  She was discharged home in stable condition.  Case discussed and evaluated with ED attending, Dr. Smith who is agreeable to patient plan of care.    Diagnosis: Chest pain  Plan: Discharge           Garcia Baumann PA-C  05/03/24 0910

## 2024-05-07 LAB
ATRIAL RATE: 64 BPM
P AXIS: 99 DEGREES
P OFFSET: 192 MS
P ONSET: 146 MS
PR INTERVAL: 164 MS
Q ONSET: 228 MS
QRS COUNT: 11 BEATS
QRS DURATION: 76 MS
QT INTERVAL: 396 MS
QTC CALCULATION(BAZETT): 408 MS
QTC FREDERICIA: 404 MS
R AXIS: -21 DEGREES
T AXIS: 203 DEGREES
T OFFSET: 426 MS
VENTRICULAR RATE: 64 BPM

## 2024-05-11 ENCOUNTER — LAB REQUISITION (OUTPATIENT)
Dept: LAB | Facility: HOSPITAL | Age: 69
End: 2024-05-11
Payer: MEDICARE

## 2024-05-11 DIAGNOSIS — R30.0 DYSURIA: ICD-10-CM

## 2024-05-11 LAB
APPEARANCE UR: ABNORMAL
BACTERIA #/AREA URNS AUTO: ABNORMAL /HPF
BILIRUB UR STRIP.AUTO-MCNC: NEGATIVE MG/DL
COLOR UR: ABNORMAL
GLUCOSE UR STRIP.AUTO-MCNC: ABNORMAL MG/DL
HYALINE CASTS #/AREA URNS AUTO: ABNORMAL /LPF
KETONES UR STRIP.AUTO-MCNC: NEGATIVE MG/DL
LEUKOCYTE ESTERASE UR QL STRIP.AUTO: ABNORMAL
MUCOUS THREADS #/AREA URNS AUTO: ABNORMAL /LPF
NITRITE UR QL STRIP.AUTO: NEGATIVE
PH UR STRIP.AUTO: 6 [PH]
PROT UR STRIP.AUTO-MCNC: ABNORMAL MG/DL
RBC # UR STRIP.AUTO: ABNORMAL /UL
RBC #/AREA URNS AUTO: ABNORMAL /HPF
SP GR UR STRIP.AUTO: 1.03
SQUAMOUS #/AREA URNS AUTO: ABNORMAL /HPF
UROBILINOGEN UR STRIP.AUTO-MCNC: NORMAL MG/DL
WBC #/AREA URNS AUTO: >50 /HPF
WBC CLUMPS #/AREA URNS AUTO: ABNORMAL /HPF
YEAST BUDDING #/AREA UR COMP ASSIST: PRESENT /HPF

## 2024-05-11 PROCEDURE — 81001 URINALYSIS AUTO W/SCOPE: CPT
